# Patient Record
Sex: FEMALE | Race: BLACK OR AFRICAN AMERICAN | NOT HISPANIC OR LATINO | Employment: FULL TIME | URBAN - METROPOLITAN AREA
[De-identification: names, ages, dates, MRNs, and addresses within clinical notes are randomized per-mention and may not be internally consistent; named-entity substitution may affect disease eponyms.]

---

## 2020-09-11 ENCOUNTER — HISTORICAL (OUTPATIENT)
Dept: ADMINISTRATIVE | Facility: HOSPITAL | Age: 50
End: 2020-09-11

## 2020-09-11 LAB
ABS NEUT (OLG): 6.36 X10(3)/MCL (ref 2.1–9.2)
ALBUMIN SERPL-MCNC: 3.6 GM/DL (ref 3.4–5)
ALBUMIN/GLOB SERPL: 0.7 RATIO (ref 1.1–2)
ALP SERPL-CCNC: 90 UNIT/L (ref 45–117)
ALT SERPL-CCNC: 149 UNIT/L (ref 12–78)
APPEARANCE, UA: CLEAR
AST SERPL-CCNC: 140 UNIT/L (ref 15–37)
BACTERIA #/AREA URNS AUTO: ABNORMAL /HPF
BASOPHILS # BLD AUTO: 0 X10(3)/MCL (ref 0–0.2)
BASOPHILS NFR BLD AUTO: 0 %
BILIRUB SERPL-MCNC: 0.5 MG/DL (ref 0.2–1)
BILIRUB UR QL STRIP: NEGATIVE
BUN SERPL-MCNC: 8 MG/DL (ref 7–18)
CALCIUM SERPL-MCNC: 8.9 MG/DL (ref 8.5–10.1)
CHLORIDE SERPL-SCNC: 102 MMOL/L (ref 98–107)
CHOLEST SERPL-MCNC: 224 MG/DL
CHOLEST/HDLC SERPL: 3.2 {RATIO} (ref 0–4.4)
CO2 SERPL-SCNC: 25 MMOL/L (ref 21–32)
COLOR UR: YELLOW
CREAT SERPL-MCNC: 0.8 MG/DL (ref 0.6–1.3)
EOSINOPHIL # BLD AUTO: 0.1 X10(3)/MCL (ref 0–0.9)
EOSINOPHIL NFR BLD AUTO: 1 %
ERYTHROCYTE [DISTWIDTH] IN BLOOD BY AUTOMATED COUNT: 14.4 % (ref 11.5–14.5)
EST. AVERAGE GLUCOSE BLD GHB EST-MCNC: 140 MG/DL
GLOBULIN SER-MCNC: 5.1 GM/ML (ref 2.3–3.5)
GLUCOSE (UA): NEGATIVE
GLUCOSE SERPL-MCNC: 90 MG/DL (ref 74–106)
HBA1C MFR BLD: 6.5 % (ref 4.2–6.3)
HCT VFR BLD AUTO: 37.7 % (ref 35–46)
HDLC SERPL-MCNC: 71 MG/DL (ref 40–59)
HGB BLD-MCNC: 11.7 GM/DL (ref 12–16)
HGB UR QL STRIP: NEGATIVE
HYALINE CASTS #/AREA URNS LPF: ABNORMAL /LPF
IMM GRANULOCYTES # BLD AUTO: 0.03 10*3/UL
IMM GRANULOCYTES NFR BLD AUTO: 0 %
KETONES UR QL STRIP: NEGATIVE
LDLC SERPL CALC-MCNC: 129 MG/DL
LEUKOCYTE ESTERASE UR QL STRIP: NEGATIVE
LYMPHOCYTES # BLD AUTO: 2.6 X10(3)/MCL (ref 0.6–4.6)
LYMPHOCYTES NFR BLD AUTO: 26 %
MCH RBC QN AUTO: 24.3 PG (ref 26–34)
MCHC RBC AUTO-ENTMCNC: 31 GM/DL (ref 31–37)
MCV RBC AUTO: 78.2 FL (ref 80–100)
MONOCYTES # BLD AUTO: 0.6 X10(3)/MCL (ref 0.1–1.3)
MONOCYTES NFR BLD AUTO: 6 %
NEUTROPHILS # BLD AUTO: 6.36 X10(3)/MCL (ref 2.1–9.2)
NEUTROPHILS NFR BLD AUTO: 66 %
NITRITE UR QL STRIP: NEGATIVE
PH UR STRIP: 6.5 [PH] (ref 4.5–8)
PLATELET # BLD AUTO: 350 X10(3)/MCL (ref 130–400)
PMV BLD AUTO: 10.5 FL (ref 7.4–10.4)
POTASSIUM SERPL-SCNC: 4.4 MMOL/L (ref 3.5–5.1)
PROT SERPL-MCNC: 8.7 GM/DL (ref 6.4–8.2)
PROT UR QL STRIP: NEGATIVE
RBC # BLD AUTO: 4.82 X10(6)/MCL (ref 4–5.2)
RBC #/AREA URNS AUTO: ABNORMAL /HPF
SODIUM SERPL-SCNC: 137 MMOL/L (ref 136–145)
SP GR UR STRIP: 1.02 (ref 1–1.03)
SQUAMOUS #/AREA URNS LPF: ABNORMAL /LPF
T4 FREE SERPL-MCNC: 0.78 NG/DL (ref 0.76–1.46)
TRIGL SERPL-MCNC: 118 MG/DL
TSH SERPL-ACNC: 2.45 MIU/L (ref 0.36–3.74)
UROBILINOGEN UR STRIP-ACNC: NORMAL
VLDLC SERPL CALC-MCNC: 24 MG/DL
WBC # SPEC AUTO: 9.7 X10(3)/MCL (ref 4.5–11)
WBC #/AREA URNS AUTO: ABNORMAL /HPF

## 2022-02-09 ENCOUNTER — HISTORICAL (OUTPATIENT)
Dept: RADIOLOGY | Facility: HOSPITAL | Age: 52
End: 2022-02-09

## 2022-02-09 ENCOUNTER — HISTORICAL (OUTPATIENT)
Dept: ADMINISTRATIVE | Facility: HOSPITAL | Age: 52
End: 2022-02-09

## 2022-04-10 ENCOUNTER — HISTORICAL (OUTPATIENT)
Dept: ADMINISTRATIVE | Facility: HOSPITAL | Age: 52
End: 2022-04-10

## 2022-04-30 VITALS
HEIGHT: 66 IN | WEIGHT: 226.44 LBS | BODY MASS INDEX: 36.39 KG/M2 | DIASTOLIC BLOOD PRESSURE: 71 MMHG | SYSTOLIC BLOOD PRESSURE: 106 MMHG | OXYGEN SATURATION: 99 %

## 2022-12-18 ENCOUNTER — HOSPITAL ENCOUNTER (EMERGENCY)
Facility: HOSPITAL | Age: 52
Discharge: HOME | End: 2022-12-18
Attending: FAMILY MEDICINE
Payer: MEDICAID

## 2022-12-18 VITALS
HEART RATE: 95 BPM | OXYGEN SATURATION: 99 % | RESPIRATION RATE: 18 BRPM | DIASTOLIC BLOOD PRESSURE: 84 MMHG | HEIGHT: 67 IN | TEMPERATURE: 100 F | BODY MASS INDEX: 35.31 KG/M2 | SYSTOLIC BLOOD PRESSURE: 127 MMHG | WEIGHT: 225 LBS

## 2022-12-18 DIAGNOSIS — J02.9 ACUTE PHARYNGITIS, UNSPECIFIED ETIOLOGY: Primary | ICD-10-CM

## 2022-12-18 LAB
FLUAV AG UPPER RESP QL IA.RAPID: NOT DETECTED
FLUBV AG UPPER RESP QL IA.RAPID: NOT DETECTED
SARS-COV-2 RNA RESP QL NAA+PROBE: NOT DETECTED
STREP A PCR (OHS): NOT DETECTED

## 2022-12-18 PROCEDURE — 0240U COVID/FLU A&B PCR: CPT | Performed by: NURSE PRACTITIONER

## 2022-12-18 PROCEDURE — 87651 STREP A DNA AMP PROBE: CPT | Performed by: NURSE PRACTITIONER

## 2022-12-18 PROCEDURE — 99283 EMERGENCY DEPT VISIT LOW MDM: CPT

## 2022-12-18 PROCEDURE — 25000003 PHARM REV CODE 250: Performed by: NURSE PRACTITIONER

## 2022-12-18 RX ORDER — AMOXICILLIN 500 MG/1
500 TABLET, FILM COATED ORAL EVERY 12 HOURS
Qty: 20 TABLET | Refills: 0 | Status: SHIPPED | OUTPATIENT
Start: 2022-12-18 | End: 2022-12-28

## 2022-12-18 RX ORDER — ACETAMINOPHEN 325 MG/1
650 TABLET ORAL
Status: COMPLETED | OUTPATIENT
Start: 2022-12-18 | End: 2022-12-18

## 2022-12-18 RX ORDER — ACETAMINOPHEN 500 MG
1000 TABLET ORAL
Status: DISCONTINUED | OUTPATIENT
Start: 2022-12-18 | End: 2022-12-18

## 2022-12-18 RX ADMIN — ACETAMINOPHEN 650 MG: 325 TABLET ORAL at 04:12

## 2022-12-18 NOTE — Clinical Note
"Angelica Basilioen" Grace was seen and treated in our emergency department on 12/18/2022.  She may return to work on 12/21/2022.       If you have any questions or concerns, please don't hesitate to call.      KARYNA Limon"

## 2022-12-18 NOTE — ED PROVIDER NOTES
Encounter Date: 12/18/2022       History     Chief Complaint   Patient presents with    Generalized Body Aches     C/o Fever,Chills,Body Aches,Throat Pain,Fatigue X 1 day     Patient states sore throat, fever, body aches, and chills x 2 days. Denies any coughing or congestion. Denies any nausea, vomiting, or diarrhea. States that sore throat is constant.     The history is provided by the patient.   Sore Throat   This is a new problem. The sore throat symptoms include sore throat and fever.The current episode started yesterday. The problem has been unchanged. The maximum temperature recorded prior to her arrival was 102 - 102.9 F. The pain is at a severity of 8/10. Associated symptoms include headaches. Pertinent negatives include no abdominal pain, congestion, coughing, diarrhea, drooling, ear pain, neck pain, shortness of breath, trouble swallowing or vomiting.   Review of patient's allergies indicates:  No Known Allergies  No past medical history on file.  No past surgical history on file.  No family history on file.     Review of Systems   Constitutional:  Positive for chills and fever.   HENT:  Positive for sore throat. Negative for congestion, drooling, ear pain and trouble swallowing.    Eyes: Negative.    Respiratory: Negative.  Negative for cough and shortness of breath.    Cardiovascular: Negative.    Gastrointestinal: Negative.  Negative for abdominal pain, diarrhea and vomiting.   Endocrine: Negative.    Genitourinary: Negative.    Musculoskeletal: Negative.  Negative for neck pain.   Skin: Negative.  Negative for rash.   Allergic/Immunologic: Negative.    Neurological:  Positive for headaches.   Hematological: Negative.    Psychiatric/Behavioral: Negative.     All other systems reviewed and are negative.    Physical Exam     Initial Vitals [12/18/22 1619]   BP Pulse Resp Temp SpO2   133/89 (!) 112 18 (!) 102.5 °F (39.2 °C) 99 %      MAP       --         Physical Exam    Nursing note and vitals  reviewed.  Constitutional: She appears well-developed and well-nourished. No distress.   HENT:   Head: Normocephalic and atraumatic.   Mouth/Throat: Uvula is midline and mucous membranes are normal. No trismus in the jaw. Oropharyngeal exudate (Mild bilateral tonsillar swelling, erythema, and exudate.) and posterior oropharyngeal erythema present. No tonsillar abscesses.   Eyes: Conjunctivae and EOM are normal. Pupils are equal, round, and reactive to light.   Neck: Neck supple.   Normal range of motion.  Cardiovascular:  Normal rate, regular rhythm, normal heart sounds and intact distal pulses.           Pulmonary/Chest: Breath sounds normal. No respiratory distress. She has no wheezes.   Abdominal: Abdomen is soft. She exhibits no distension. There is no abdominal tenderness.   Musculoskeletal:         General: No tenderness or edema. Normal range of motion.      Cervical back: Normal range of motion and neck supple.     Lymphadenopathy:     She has cervical adenopathy.   Neurological: She is alert and oriented to person, place, and time. She has normal strength. GCS score is 15. GCS eye subscore is 4. GCS verbal subscore is 5. GCS motor subscore is 6.   Skin: Skin is warm and dry. No rash noted.   Psychiatric: She has a normal mood and affect. Thought content normal.       ED Course   Procedures  Labs Reviewed   COVID/FLU A&B PCR - Normal    Narrative:     The Xpert Xpress SARS-CoV-2/FLU/RSV plus is a rapid, multiplexed real-time PCR test intended for the simultaneous qualitative detection and differentiation of SARS-CoV-2, Influenza A, Influenza B, and respiratory syncytial virus (RSV) viral RNA in either nasopharyngeal swab or nasal swab specimens.         STREP GROUP A BY PCR - Normal    Narrative:     The Xpert Xpress Strep A test is a rapid, qualitative in vitro diagnostic test for the detection of Streptococcus pyogenes (Group A ß-hemolytic Streptococcus, Strep A) in throat swab specimens from patients with  signs and symptoms of pharyngitis.            Imaging Results    None          Medications   acetaminophen tablet 650 mg (650 mg Oral Given 12/18/22 1652)     Medical Decision Making:   Initial Assessment:   Patient is awake, alert, ambulatory, and nontoxic appearing in the ED.  Differential Diagnosis:   Covid, Flu, Strep, Pharyngitis, Viral Illness.   Clinical Tests:   Lab Tests: Ordered and Reviewed  ED Management:  Fever has improved after Tylenol in the ED.            ED Course as of 12/18/22 1748   Sun Dec 18, 2022   1742 BP: 139/82 [AB]      ED Course User Index  [AB] KARYNA Limon                 Clinical Impression:   Final diagnoses:  [J02.9] Acute pharyngitis, unspecified etiology (Primary)        ED Disposition Condition    Discharge Stable          ED Prescriptions       Medication Sig Dispense Start Date End Date Auth. Provider    amoxicillin (AMOXIL) 500 MG Tab Take 1 tablet (500 mg total) by mouth every 12 (twelve) hours. for 10 days 20 tablet 12/18/2022 12/28/2022 KARYNA Limon          Follow-up Information       Follow up With Specialties Details Why Contact Info    [employer] [employer] PA-C Internal Medicine In 3 days  [address] [place] [address] [id number] [phone]               KARYNA Limon  12/18/22 8541

## 2023-06-11 ENCOUNTER — HOSPITAL ENCOUNTER (EMERGENCY)
Facility: HOSPITAL | Age: 53
Discharge: HOME | End: 2023-06-11
Attending: INTERNAL MEDICINE
Payer: MEDICAID

## 2023-06-11 VITALS
SYSTOLIC BLOOD PRESSURE: 154 MMHG | HEIGHT: 64 IN | WEIGHT: 215 LBS | TEMPERATURE: 102 F | DIASTOLIC BLOOD PRESSURE: 92 MMHG | BODY MASS INDEX: 36.7 KG/M2 | HEART RATE: 100 BPM | OXYGEN SATURATION: 97 % | RESPIRATION RATE: 18 BRPM

## 2023-06-11 DIAGNOSIS — J02.0 STREP PHARYNGITIS: Primary | ICD-10-CM

## 2023-06-11 LAB
APPEARANCE UR: ABNORMAL
BACTERIA #/AREA URNS AUTO: ABNORMAL /HPF
BILIRUB UR QL STRIP.AUTO: NEGATIVE MG/DL
COLOR UR: YELLOW
FLUAV AG UPPER RESP QL IA.RAPID: NOT DETECTED
FLUBV AG UPPER RESP QL IA.RAPID: NOT DETECTED
GLUCOSE UR QL STRIP.AUTO: NEGATIVE MG/DL
KETONES UR QL STRIP.AUTO: NEGATIVE MG/DL
LEUKOCYTE ESTERASE UR QL STRIP.AUTO: NEGATIVE UNIT/L
MUCOUS THREADS URNS QL MICRO: ABNORMAL /LPF
NITRITE UR QL STRIP.AUTO: NEGATIVE
PH UR STRIP.AUTO: 8 [PH]
PROT UR QL STRIP.AUTO: 30 MG/DL
RBC #/AREA URNS AUTO: ABNORMAL /HPF
RBC UR QL AUTO: NEGATIVE UNIT/L
SARS-COV-2 RNA RESP QL NAA+PROBE: NOT DETECTED
SP GR UR STRIP.AUTO: 1.01
SQUAMOUS #/AREA URNS AUTO: ABNORMAL /HPF
STREP A PCR (OHS): DETECTED
UROBILINOGEN UR STRIP-ACNC: 4 MG/DL
WBC #/AREA URNS AUTO: ABNORMAL /HPF

## 2023-06-11 PROCEDURE — 81001 URINALYSIS AUTO W/SCOPE: CPT | Performed by: INTERNAL MEDICINE

## 2023-06-11 PROCEDURE — 0240U COVID/FLU A&B PCR: CPT | Performed by: INTERNAL MEDICINE

## 2023-06-11 PROCEDURE — 63600175 PHARM REV CODE 636 W HCPCS: Performed by: INTERNAL MEDICINE

## 2023-06-11 PROCEDURE — 87651 STREP A DNA AMP PROBE: CPT | Performed by: INTERNAL MEDICINE

## 2023-06-11 PROCEDURE — 96372 THER/PROPH/DIAG INJ SC/IM: CPT | Performed by: INTERNAL MEDICINE

## 2023-06-11 PROCEDURE — 99284 EMERGENCY DEPT VISIT MOD MDM: CPT

## 2023-06-11 PROCEDURE — 25000003 PHARM REV CODE 250: Performed by: INTERNAL MEDICINE

## 2023-06-11 RX ORDER — LIDOCAINE HYDROCHLORIDE 10 MG/ML
1 INJECTION INFILTRATION; PERINEURAL ONCE
Status: COMPLETED | OUTPATIENT
Start: 2023-06-11 | End: 2023-06-11

## 2023-06-11 RX ORDER — CEFTRIAXONE 1 G/1
1 INJECTION, POWDER, FOR SOLUTION INTRAMUSCULAR; INTRAVENOUS ONCE
Status: COMPLETED | OUTPATIENT
Start: 2023-06-11 | End: 2023-06-11

## 2023-06-11 RX ORDER — PREDNISONE 20 MG/1
20 TABLET ORAL DAILY
Qty: 5 TABLET | Refills: 0 | Status: SHIPPED | OUTPATIENT
Start: 2023-06-11 | End: 2023-06-16

## 2023-06-11 RX ORDER — AMOXICILLIN 875 MG/1
875 TABLET, FILM COATED ORAL EVERY 12 HOURS
Qty: 20 TABLET | Refills: 0 | Status: SHIPPED | OUTPATIENT
Start: 2023-06-11 | End: 2023-06-21

## 2023-06-11 RX ORDER — ACETAMINOPHEN 500 MG
1000 TABLET ORAL
Status: COMPLETED | OUTPATIENT
Start: 2023-06-11 | End: 2023-06-11

## 2023-06-11 RX ORDER — DEXAMETHASONE SODIUM PHOSPHATE 4 MG/ML
8 INJECTION, SOLUTION INTRA-ARTICULAR; INTRALESIONAL; INTRAMUSCULAR; INTRAVENOUS; SOFT TISSUE ONCE
Status: COMPLETED | OUTPATIENT
Start: 2023-06-11 | End: 2023-06-11

## 2023-06-11 RX ADMIN — LIDOCAINE HYDROCHLORIDE 1 ML: 10 INJECTION, SOLUTION INFILTRATION; PERINEURAL at 08:06

## 2023-06-11 RX ADMIN — DEXAMETHASONE SODIUM PHOSPHATE 8 MG: 4 INJECTION, SOLUTION INTRA-ARTICULAR; INTRALESIONAL; INTRAMUSCULAR; INTRAVENOUS; SOFT TISSUE at 08:06

## 2023-06-11 RX ADMIN — CEFTRIAXONE SODIUM 1 G: 1 INJECTION, POWDER, FOR SOLUTION INTRAMUSCULAR; INTRAVENOUS at 08:06

## 2023-06-11 RX ADMIN — ACETAMINOPHEN 1000 MG: 500 TABLET, FILM COATED ORAL at 07:06

## 2023-06-11 NOTE — Clinical Note
"Angelica"Angelica" Grace was seen and treated in our emergency department on 6/11/2023.  She may return to work on 06/15/2023.       If you have any questions or concerns, please don't hesitate to call.      Gordon Galloway, DO"

## 2023-06-11 NOTE — ED PROVIDER NOTES
"     Source of History:  Patient, no limitations    Chief complaint:  Generalized Body Aches (Pt complaint of (1) body aches (2) sore throat (3) urinary frequency)      HPI:  Angelica Edwards is a 52 y.o. female presenting with Generalized Body Aches (Pt complaint of (1) body aches (2) sore throat (3) urinary frequency)         Patient complains of sore throat. Associated symptoms include fevers up to 103 degrees and sore throat.Onset of symptoms was 1 day ago, unchanged since that time. She is drinking plenty of fluids. She has had recent close exposure to someone with proven streptococcal pharyngitis.        Review of Systems   Constitutional symptoms:  Negative except as documented in HPI.   Skin symptoms:  Negative except as documented in HPI.   HEENT symptoms:  Negative except as documented in HPI.   Respiratory symptoms:  Negative except as documented in HPI.   Cardiovascular symptoms:  Negative except as documented in HPI.   Gastrointestinal symptoms:  Negative except as documented in HPI.    Genitourinary symptoms:  Negative except as documented in HPI.   Musculoskeletal symptoms:  Negative except as documented in HPI.   Neurologic symptoms:  Negative except as documented in HPI.   Psychiatric symptoms:  Negative except as documented in HPI.   Allergy/immunologic symptoms:  Negative except as documented in HPI.             Additional review of systems information: All other systems reviewed and otherwise negative.      Review of patient's allergies indicates:  No Known Allergies    PMH:  As per HPI and below:    No past medical history on file.     No family history on file.    No past surgical history on file.         There is no problem list on file for this patient.       Physical Exam:    BP (!) 154/92 (BP Location: Left arm, Patient Position: Sitting)   Pulse 110   Temp (!) 103 °F (39.4 °C)   Resp 18   Ht 5' 4" (1.626 m)   Wt 97.5 kg (215 lb)   SpO2 97%   BMI 36.90 kg/m²     Nursing note and vital " signs reviewed.    General:  Alert, appears uncomfortable, febrile 103 F  Skin: Normal for Ethnic Origin, No cyanosis  HEENT: Normocephalic and atraumatic, Vision unchanged, Pupils symmetric, No icterus , Nasal mucosa is pink and moist, marked pharyngeal erythema  Cardiovascular:  Regular rate and rhythm, No edema  Chest Wall: No deformity, equal chest rise  Respiratory:  Lungs are clear to auscultation, respirations are non-labored.    Musculoskeletal:  No deformity, Normal perfusion to all extremities  Gastrointestinal:  Soft, Non distended  Neurological:  Alert and oriented, normal motor observed, normal speech observed.    Psychiatric:  Cooperative, appropriate mood & affect.        Labs that have been ordered have been independently reviewed and interpreted by myself.     Old Chart Reviewed.      Initial Impression/ Differential Dx:  Viral pharyngitis, strep pharyngitis or other bacterial pharyngitis, postnasal drip, laryngitis, esophagitis/GERD    Considered but not suspected at this time:  Jayro's angina, epiglottitis, foreign body aspiration, retropharyngeal abscess, peritonsillar abscess, esophageal perforation, mediastinitis, sialolithiasis, parotitis, laryngitis, tracheitis, dental/periapical abscess, TMJ disorder, pneumonia      MDM:      Reviewed Nurses Note.    Reviewed Pertinent old records.    Orders Placed This Encounter    COVID/FLU A&B PCR    Strep Group A by PCR    Urinalysis, Reflex to Urine Culture    Urinalysis, Microscopic    acetaminophen tablet 1,000 mg    dexAMETHasone injection 8 mg    cefTRIAXone injection 1 g    LIDOcaine HCL 10 mg/ml (1%) injection 1 mL    predniSONE (DELTASONE) 20 MG tablet    amoxicillin (AMOXIL) 875 MG tablet                    Labs Reviewed   STREP GROUP A BY PCR - Abnormal; Notable for the following components:       Result Value    STREP A PCR ([facility]) Detected (*)     All other components within normal limits    Narrative:     The Xpert Xpress Strep A test is a  rapid, qualitative in vitro diagnostic test for the detection of Streptococcus pyogenes (Group A ß-hemolytic Streptococcus, Strep A) in throat swab specimens from patients with signs and symptoms of pharyngitis.     URINALYSIS, REFLEX TO URINE CULTURE - Abnormal; Notable for the following components:    Appearance, UA Hazy (*)     Protein, UA 30 (*)     Urobilinogen, UA 4.0 (*)     All other components within normal limits   URINALYSIS, MICROSCOPIC - Abnormal; Notable for the following components:    Bacteria, UA Few (*)     Mucous, UA Small (*)     Squamous Epithelial Cells, UA Many (*)     All other components within normal limits   COVID/FLU A&B PCR - Normal    Narrative:     The Xpert Xpress SARS-CoV-2/FLU/RSV plus is a rapid, multiplexed real-time PCR test intended for the simultaneous qualitative detection and differentiation of SARS-CoV-2, Influenza A, Influenza B, and respiratory syncytial virus (RSV) viral RNA in either nasopharyngeal swab or nasal swab specimens.                No orders to display        Admission on 06/11/2023   Component Date Value Ref Range Status    Influenza A PCR 06/11/2023 Not Detected  Not Detected Final    Influenza B PCR 06/11/2023 Not Detected  Not Detected Final    SARS-CoV-2 PCR 06/11/2023 Not Detected  Not Detected, Negative, Invalid Final    STREP A PCR ([facility]) 06/11/2023 Detected (A)  Not Detected Final    Color, UA 06/11/2023 Yellow  Yellow, Light-Yellow, Dark Yellow, Diane, Straw Final    Appearance, UA 06/11/2023 Hazy (A)  Clear Final    Specific Gravity, UA 06/11/2023 1.015   Final    pH, UA 06/11/2023 8.0  5.0 - 8.5 Final    Protein, UA 06/11/2023 30 (A)  Negative mg/dL Final    Glucose, UA 06/11/2023 Negative  Negative, Normal mg/dL Final    Ketones, UA 06/11/2023 Negative  Negative mg/dL Final    Blood, UA 06/11/2023 Negative  Negative unit/L Final    Bilirubin, UA 06/11/2023 Negative  Negative mg/dL Final    Urobilinogen, UA 06/11/2023 4.0 (A)  0.2, 1.0, Normal mg/dL  Final    Nitrites, UA 06/11/2023 Negative  Negative Final    Leukocyte Esterase, UA 06/11/2023 Negative  Negative unit/L Final    Bacteria, UA 06/11/2023 Few (A)  None Seen, Rare, Occasional /HPF Final    Mucous, UA 06/11/2023 Small (A)  None Seen /LPF Final    RBC, UA 06/11/2023 0-5  None Seen, 0-2, 3-5, 0-5 /HPF Final    WBC, UA 06/11/2023 0-2  None Seen, 0-2, 3-5, 0-5 /HPF Final    Squamous Epithelial Cells, UA 06/11/2023 Many (A)  None Seen, Rare, Occasional, Occ /HPF Final       Imaging Results    None                        ED Course as of 06/11/23 0832   Sun Jun 11, 2023   0826 STREP A PCR ([facility])(!): Detected [MP]      ED Course User Index  [MP] Gordon Galloway DO                        Diagnostic Impression:    1. Strep pharyngitis         ED Disposition Condition    Discharge Stable             Follow-up Information       [facility] - Emergency Dept.    Specialty: Emergency Medicine  Why: If symptoms worsen  Contact information:  [address]  [facility] [address]  [phone]                            ED Prescriptions       Medication Sig Dispense Start Date End Date Auth. Provider    predniSONE (DELTASONE) 20 MG tablet Take 1 tablet (20 mg total) by mouth once daily. for 5 days 5 tablet 6/11/2023 6/16/2023 Gordon Galloway DO    amoxicillin (AMOXIL) 875 MG tablet Take 1 tablet (875 mg total) by mouth every 12 (twelve) hours. for 10 days 20 tablet 6/11/2023 6/21/2023 Gordon Galloway DO          Follow-up Information       Follow up With Specialties Details Why Contact Info    [facility] - Emergency Dept Emergency Medicine  If symptoms worsen [address]  [facility] [id number] [phone]             Gordon Galloway DO  06/11/23 0832

## 2023-09-19 ENCOUNTER — HOSPITAL ENCOUNTER (OUTPATIENT)
Dept: RADIOLOGY | Facility: HOSPITAL | Age: 53
Discharge: HOME | End: 2023-09-19
Attending: PHYSICIAN ASSISTANT
Payer: MEDICAID

## 2023-09-19 DIAGNOSIS — M54.42 LOW BACK PAIN WITH LEFT-SIDED SCIATICA: Primary | ICD-10-CM

## 2023-09-19 DIAGNOSIS — M54.42 LOW BACK PAIN WITH LEFT-SIDED SCIATICA: ICD-10-CM

## 2023-09-19 PROCEDURE — 72100 X-RAY EXAM L-S SPINE 2/3 VWS: CPT | Mod: TC

## 2024-01-23 DIAGNOSIS — M47.816 LUMBAR ARTHROPATHY: Primary | ICD-10-CM

## 2024-02-05 ENCOUNTER — HOSPITAL ENCOUNTER (OUTPATIENT)
Dept: RADIOLOGY | Facility: HOSPITAL | Age: 54
Discharge: HOME | End: 2024-02-05
Attending: PHYSICIAN ASSISTANT
Payer: MEDICAID

## 2024-02-05 DIAGNOSIS — M47.816 LUMBAR ARTHROPATHY: ICD-10-CM

## 2024-02-05 PROCEDURE — 72148 MRI LUMBAR SPINE W/O DYE: CPT | Mod: TC

## 2024-12-16 ENCOUNTER — HOSPITAL ENCOUNTER (EMERGENCY)
Facility: HOSPITAL | Age: 54
Discharge: HOME | End: 2024-12-16
Attending: EMERGENCY MEDICINE
Payer: MEDICAID

## 2024-12-16 VITALS
BODY MASS INDEX: 34.87 KG/M2 | TEMPERATURE: 99 F | HEIGHT: 66 IN | WEIGHT: 217 LBS | RESPIRATION RATE: 18 BRPM | DIASTOLIC BLOOD PRESSURE: 99 MMHG | HEART RATE: 78 BPM | OXYGEN SATURATION: 98 % | SYSTOLIC BLOOD PRESSURE: 134 MMHG

## 2024-12-16 DIAGNOSIS — M54.32 BILATERAL SCIATICA: Primary | ICD-10-CM

## 2024-12-16 DIAGNOSIS — M54.31 BILATERAL SCIATICA: Primary | ICD-10-CM

## 2024-12-16 PROCEDURE — 63600175 PHARM REV CODE 636 W HCPCS

## 2024-12-16 PROCEDURE — 99284 EMERGENCY DEPT VISIT MOD MDM: CPT | Mod: 25

## 2024-12-16 PROCEDURE — 96372 THER/PROPH/DIAG INJ SC/IM: CPT

## 2024-12-16 RX ORDER — TRAZODONE HYDROCHLORIDE 50 MG/1
50 TABLET ORAL NIGHTLY
COMMUNITY
Start: 2024-11-22

## 2024-12-16 RX ORDER — AMLODIPINE BESYLATE 5 MG/1
5 TABLET ORAL
COMMUNITY
Start: 2024-12-02

## 2024-12-16 RX ORDER — TRAMADOL HYDROCHLORIDE 50 MG/1
50 TABLET ORAL EVERY 8 HOURS
COMMUNITY
Start: 2024-11-25

## 2024-12-16 RX ORDER — CYCLOBENZAPRINE HCL 10 MG
10 TABLET ORAL 3 TIMES DAILY PRN
Qty: 15 TABLET | Refills: 0 | Status: SHIPPED | OUTPATIENT
Start: 2024-12-16 | End: 2024-12-21

## 2024-12-16 RX ORDER — GABAPENTIN 800 MG/1
800 TABLET ORAL 2 TIMES DAILY
COMMUNITY
Start: 2024-11-22

## 2024-12-16 RX ORDER — LIDOCAINE 50 MG/G
1 PATCH TOPICAL DAILY
Qty: 10 PATCH | Refills: 0 | Status: SHIPPED | OUTPATIENT
Start: 2024-12-16

## 2024-12-16 RX ORDER — ROSUVASTATIN CALCIUM 10 MG/1
10 TABLET, COATED ORAL
COMMUNITY
Start: 2024-12-02

## 2024-12-16 RX ORDER — HYDROCHLOROTHIAZIDE 25 MG/1
25 TABLET ORAL EVERY MORNING
COMMUNITY
Start: 2024-12-02

## 2024-12-16 RX ORDER — SEMAGLUTIDE 1.34 MG/ML
INJECTION, SOLUTION SUBCUTANEOUS
COMMUNITY

## 2024-12-16 RX ORDER — KETOROLAC TROMETHAMINE 10 MG/1
10 TABLET, FILM COATED ORAL EVERY 8 HOURS PRN
Qty: 15 TABLET | Refills: 0 | Status: SHIPPED | OUTPATIENT
Start: 2024-12-16 | End: 2024-12-21

## 2024-12-16 RX ORDER — KETOROLAC TROMETHAMINE 30 MG/ML
30 INJECTION, SOLUTION INTRAMUSCULAR; INTRAVENOUS
Status: COMPLETED | OUTPATIENT
Start: 2024-12-16 | End: 2024-12-16

## 2024-12-16 RX ORDER — CYCLOBENZAPRINE HCL 10 MG
10 TABLET ORAL
Status: DISCONTINUED | OUTPATIENT
Start: 2024-12-16 | End: 2024-12-16 | Stop reason: HOSPADM

## 2024-12-16 RX ADMIN — KETOROLAC TROMETHAMINE 30 MG: 30 INJECTION, SOLUTION INTRAMUSCULAR at 01:12

## 2024-12-16 NOTE — ED PROVIDER NOTES
Encounter Date: 12/16/2024       History     Chief Complaint   Patient presents with    Back Pain     Pt complaint of reoccurring back pain pain not relieved with trammadol RX. Pt denies recent injury     54 y.o.  female with a history of Sciatica, DM, and hypertension presents to Emergency Department with a chief complaint of atraumatic lower back pain. Symptoms began several days ago and have been constant since onset. Patient currently being treated for sciatica outpatient with Tramadol. Has had previous imaging outpatient. Reports pain radiates down bilateral legs. Associated symptoms: numbness and tingling. Symptoms are aggravated with palpation and exertion and there are no alleviating factors. The patient denies CP, SOB, injury/trauma, fever, chills, incontinence, or saddle anesthesia.. No other reported symptoms at this time      The history is provided by the patient. No  was used.   Back Pain   This is a chronic problem. The current episode started several days ago. The problem occurs throughout the day. The problem has been unchanged. The pain is associated with no known injury. The pain is present in the gluteal region. The pain radiates to the left leg and right leg. The pain is The same all the time. Associated symptoms include numbness and tingling. Pertinent negatives include no chest pain, no fever, no abdominal pain, no abdominal swelling, no bowel incontinence, no perianal numbness, no bladder incontinence, no dysuria, no pelvic pain, no paresthesias, no paresis and no weakness. She has tried analgesics for the symptoms. The treatment provided mild relief.     Review of patient's allergies indicates:  No Known Allergies  Past Medical History:   Diagnosis Date    HTN (hypertension)     Mixed hyperlipidemia     Sciatica     T2DM (type 2 diabetes mellitus)      History reviewed. No pertinent surgical history.  No family history on file.  Social History     Tobacco  Use    Smoking status: Never     Passive exposure: Never    Smokeless tobacco: Never   Substance Use Topics    Alcohol use: Never    Drug use: Never     Review of Systems   Constitutional:  Negative for diaphoresis, fatigue and fever.   Eyes:  Negative for photophobia and visual disturbance.   Respiratory:  Negative for cough, shortness of breath, wheezing and stridor.    Cardiovascular:  Negative for chest pain.   Gastrointestinal:  Negative for abdominal pain, bowel incontinence, nausea and vomiting.   Genitourinary:  Negative for bladder incontinence, dysuria and pelvic pain.   Musculoskeletal:  Positive for back pain and myalgias. Negative for gait problem and joint swelling.   Neurological:  Positive for tingling and numbness. Negative for dizziness, tremors, facial asymmetry, weakness and paresthesias.   All other systems reviewed and are negative.      Physical Exam     Initial Vitals [12/16/24 1128]   BP Pulse Resp Temp SpO2   (!) 134/99 78 18 98.6 °F (37 °C) 98 %      MAP       --         Physical Exam    Nursing note and vitals reviewed.  Constitutional: She appears well-developed and well-nourished. She is not diaphoretic. She is cooperative.  Non-toxic appearance. No distress.   HENT:   Head: Normocephalic and atraumatic.   Right Ear: External ear normal.   Left Ear: External ear normal.   Nose: Nose normal.   Eyes: Conjunctivae and EOM are normal. Pupils are equal, round, and reactive to light.   Neck: Neck supple.   Normal range of motion.  Cardiovascular:  Normal rate, regular rhythm, S1 normal, S2 normal, normal heart sounds, intact distal pulses and normal pulses.           Pulmonary/Chest: Effort normal and breath sounds normal. No tachypnea and no bradypnea. No respiratory distress. She has no decreased breath sounds. She has no wheezes. She has no rhonchi. She has no rales.   Abdominal: Abdomen is soft. Bowel sounds are normal. She exhibits no distension. There is no abdominal tenderness. There  is no rebound.   Musculoskeletal:         General: Tenderness present. Normal range of motion.      Cervical back: Normal range of motion and neck supple.        Legs:       Comments: Tenderness noted to outlined area. No spinous tenderness on exam. Full 5/5 ROM noted. CMS intact. All other adjacent joints otherwise normal.        Neurological: She is alert and oriented to person, place, and time. She has normal strength. No sensory deficit. GCS score is 15. GCS eye subscore is 4. GCS verbal subscore is 5. GCS motor subscore is 6.   Skin: Skin is warm and dry. Capillary refill takes less than 2 seconds.   Psychiatric: She has a normal mood and affect. Thought content normal.         ED Course   Procedures  Labs Reviewed - No data to display       Imaging Results    None          Medications   cyclobenzaprine tablet 10 mg (10 mg Oral Not Given 12/16/24 1230)   ketorolac injection 30 mg (30 mg Intramuscular Given 12/16/24 1308)     Medical Decision Making  Patient awake, alert, has non-labored breathing, and follows commands appropriately. Arrived to ED due to atraumatic back pain. Patient reports this is chronic. Hx of sciatica. Afebrile. Denies injury/trauma. NAD Noted.     Judging by the patient's chief complaint and pertinent history, the patient has the following possible differential diagnoses, including but not limited to the following: Chronic Back Pain, Sciatica, Lumbar Strain     Some of these are deemed to be lower likelihood and some more likely based on my physical exam and history combined with possible lab work and/or imaging studies. Please see the pertinent studies, and refer to the HPI. Some of these diagnoses will take further evaluation to fully rule out, perhaps as an outpatient and the patient was encouraged to follow up when discharged for more comprehensive evaluation.       Amount and/or Complexity of Data Reviewed  Discussion of management or test interpretation with external provider(s):  Discussed plan of care and interventions with patient. Agreed to and aware of plan of care. Comfortable being discharged home. Patient discharged home. Patient denies new or additional complaints; no further tests indicated at this time. Verbalized understanding of instructions. No emergent or apparent distress noted prior to discharge. Strict ER return precautions given.       Risk  OTC drugs.  Prescription drug management.               ED Course as of 12/16/24 1327   Mon Dec 16, 2024   1231 Patient's symptoms chronic; currently being treated outpatient for sciatica and has previous MRI. Since patient denies recent injury/trauma, symptoms ongoing, and no red flags, no imaging indicated currently. Will give medication to treat symptoms. Cautioned on side effects. Patient's script of Tramadol is 30 days, instructed to f/u with PCP regarding change of medication. At disposition, patient has no additional complaints, has no red flags, ambulatory, has no spinous tenderness, and non-toxic in appearance. Stable for discharge home.  [JA]      ED Course User Index  [JA] Marce Sheehan NP                           Clinical Impression:  Final diagnoses:  [M54.31, M54.32] Bilateral sciatica (Primary)          ED Disposition Condition    Discharge Stable          ED Prescriptions       Medication Sig Dispense Start Date End Date Auth. Provider    ketorolac (TORADOL) 10 mg tablet Take 1 tablet (10 mg total) by mouth every 8 (eight) hours as needed for Pain. 15 tablet 12/16/2024 12/21/2024 Marce Sheehan NP    LIDOcaine (LIDODERM) 5 % Place 1 patch onto the skin once daily. Remove & Discard patch within 12 hours or as directed by MD 10 patch 12/16/2024 -- Marce Sheehan, NP    cyclobenzaprine (FLEXERIL) 10 MG tablet Take 1 tablet (10 mg total) by mouth 3 (three) times daily as needed for Muscle spasms. 15 tablet 12/16/2024 12/21/2024 Marce Sheehan, DUARTE          Follow-up Information       Follow up With  Specialties Details Why Contact Info    [facility] PA-C Internal Medicine Call in 1 day If symptoms worsen, As needed [address] [facility] [address] [phone]      [facility] - Emergency Dept Emergency Medicine Go to  If symptoms worsen, As needed [id number]  [facility] [id number] [phone]             Marce Sheehan, NP  12/16/24 4589

## 2024-12-16 NOTE — DISCHARGE INSTRUCTIONS
Thanks for letting us take care of you today!  It is our goal to give you courteous care and to keep you comfortable and informed, if you have any questions before you leave I will be happy to try and answer them.    Here is some advice after your visit:      Your visit in the emergency department is NOT definitive care - please follow-up with your primary care doctor and/or specialist within 1 week.  Please return if you have any worsening in your condition or if you have any other concerns.    You have been prescribed Toradol for pain. This is an Non-Steroidal Anti-Inflammatory (NSAID) Medication. Please do not take any additional NSAIDs while you are taking this medication including (Advil, Aleve, Motrin, Ibuprofen, Mobic\meloxicam, Naprosyn, Toradol, etc.). Please stop taking this medication if you experience: weakness, itching, yellow skin or eyes, joint pains, vomiting blood, blood or black stools, unusual weight gain, or swelling in your arms, legs, hands, or feet.     You have been prescribed Flexeril (Cyclobenzaprine) for muscle spasms/pain. Please do not take this medication while working, drinking alcohol, swimming, or while driving/operating heavy machinery. This medication may cause drowsiness, dizziness, impair judgment, and reduce physical capabilities.You should not drive, operate heavy machinery, or make life changing decisions while taking this medication.      While in the Emergency Department you received medication that may cause drowsiness, dizziness, impaired judgment, and reduced physical capabilities. You should not drive, operate heavy machinery, swim, or make life  changing decisions within 24 hours of receiving this medication.

## 2024-12-16 NOTE — ED TRIAGE NOTES
Pt complaint of reoccurring back pain pain not relieved with trammadol RX. Pt denies recent injury

## 2025-05-19 DIAGNOSIS — R05.8 OTHER COUGH: Primary | ICD-10-CM

## 2025-06-20 ENCOUNTER — CLINICAL SUPPORT (OUTPATIENT)
Dept: RESPIRATORY THERAPY | Facility: HOSPITAL | Age: 55
End: 2025-06-20
Attending: PHYSICIAN ASSISTANT
Payer: MEDICAID

## 2025-06-20 VITALS — OXYGEN SATURATION: 99 % | RESPIRATION RATE: 18 BRPM | HEART RATE: 66 BPM

## 2025-06-20 DIAGNOSIS — R05.8 OTHER COUGH: ICD-10-CM

## 2025-06-20 LAB
DLCO ADJ PRE: 19 ML/(MIN*MMHG)
DLCO SINGLE BREATH LLN: 19.07
DLCO SINGLE BREATH PRE REF: 76.6 %
DLCO SINGLE BREATH REF: 24.81
DLCOC SBVA LLN: 3.29
DLCOC SBVA PRE REF: 95.3 %
DLCOC SBVA REF: 4.68
DLCOC SINGLE BREATH LLN: 19.07
DLCOC SINGLE BREATH PRE REF: 76.6 %
DLCOC SINGLE BREATH REF: 24.81
DLCOVA LLN: 3.29
DLCOVA PRE REF: 95.3 %
DLCOVA PRE: 4.46 ML/(MIN*MMHG*L)
DLCOVA REF: 4.68
DLVAADJ PRE: 4.46 ML/(MIN*MMHG*L)
ERV LLN: -16449.1
ERV PRE REF: 60.2 %
ERV REF: 0.9
FEF 25 75 LLN: 1.04
FEF 25 75 PRE REF: 94.4 %
FEF 25 75 REF: 2.28
FEV1 FVC LLN: 69
FEV1 FVC PRE REF: 92.5 %
FEV1 FVC REF: 80
FEV1 LLN: 1.79
FEV1 PRE REF: 108.1 %
FEV1 REF: 2.42
FRCPLETH LLN: 2
FRCPLETH PREREF: 68.6 %
FRCPLETH REF: 2.82
FVC LLN: 2.28
FVC PRE REF: 116.2 %
FVC REF: 3.04
IVC PRE: 3.1 L
IVC SINGLE BREATH LLN: 2.28
IVC SINGLE BREATH PRE REF: 102.1 %
IVC SINGLE BREATH REF: 3.04
MVV LLN: 90
MVV PRE REF: 91.1 %
MVV REF: 106
PEF LLN: 4.13
PEF PRE REF: 71.3 %
PEF REF: 6.27
PRE DLCO: 19 ML/(MIN*MMHG)
PRE ERV: 0.54 L
PRE FEF 25 75: 2.15 L/S
PRE FET 100: 8.7 SEC
PRE FEV1 FVC: 73.99 %
PRE FEV1: 2.61 L
PRE FRC PL: 1.93 L
PRE FVC: 3.53 L
PRE MVV: 97 L/MIN
PRE PEF: 4.47 L/S
PRE RV: 1.15 L
PRE TLC: 4.71 L
RAW LLN: 3.06
RAW PRE REF: 129 %
RAW PRE: 3.95 CMH2O*S/L
RAW REF: 3.06
RV LLN: 1.35
RV PRE REF: 59.7 %
RV REF: 1.92
RVTLC LLN: 28
RVTLC PRE REF: 64.6 %
RVTLC PRE: 24.32 %
RVTLC REF: 38
TLC LLN: 4.31
TLC PRE REF: 89 %
TLC REF: 5.3
VA PRE: 4.26 L
VA SINGLE BREATH LLN: 5.15
VA SINGLE BREATH PRE REF: 82.7 %
VA SINGLE BREATH REF: 5.15
VC LLN: 2.28
VC PRE REF: 117.4 %
VC PRE: 3.57 L
VC REF: 3.04
VTGRAWPRE: 2.3 L

## 2025-06-20 PROCEDURE — 94010 BREATHING CAPACITY TEST: CPT

## 2025-06-20 PROCEDURE — 94761 N-INVAS EAR/PLS OXIMETRY MLT: CPT

## 2025-06-20 PROCEDURE — 25000242 PHARM REV CODE 250 ALT 637 W/ HCPCS: Performed by: INTERNAL MEDICINE

## 2025-06-20 PROCEDURE — 63600175 PHARM REV CODE 636 W HCPCS: Performed by: INTERNAL MEDICINE

## 2025-06-20 PROCEDURE — 95070 INHLJ BRNCL CHALLENGE TSTG: CPT

## 2025-06-20 PROCEDURE — 94726 PLETHYSMOGRAPHY LUNG VOLUMES: CPT

## 2025-06-20 PROCEDURE — 94729 DIFFUSING CAPACITY: CPT

## 2025-06-20 PROCEDURE — 99900031 HC PATIENT EDUCATION (STAT)

## 2025-06-20 PROCEDURE — 99900035 HC TECH TIME PER 15 MIN (STAT)

## 2025-06-20 RX ORDER — METHACHOLINE CHLORIDE 0.75/3ML
3 VIAL, NEBULIZER (ML) INHALATION ONCE
Status: COMPLETED | OUTPATIENT
Start: 2025-06-20 | End: 2025-06-20

## 2025-06-20 RX ORDER — METHACHOLINE CHLORIDE 0.1875/3ML
3 VIAL, NEBULIZER (ML) INHALATION ONCE
Status: COMPLETED | OUTPATIENT
Start: 2025-06-20 | End: 2025-06-20

## 2025-06-20 RX ORDER — METHACHOLINE CHLORIDE 12 MG/3 ML
3 VIAL, NEBULIZER (ML) INHALATION ONCE
Status: COMPLETED | OUTPATIENT
Start: 2025-06-20 | End: 2025-06-20

## 2025-06-20 RX ORDER — METHACHOLINE CHLORIDE 3 MG/3 ML
3 VIAL, NEBULIZER (ML) INHALATION ONCE
Status: COMPLETED | OUTPATIENT
Start: 2025-06-20 | End: 2025-06-20

## 2025-06-20 RX ORDER — ALBUTEROL SULFATE 0.83 MG/ML
2.5 SOLUTION RESPIRATORY (INHALATION)
Status: COMPLETED | OUTPATIENT
Start: 2025-06-20 | End: 2025-06-20

## 2025-06-20 RX ORDER — METHACHOLINE CHLORIDE 48 MG/3 ML
3 VIAL, NEBULIZER (ML) INHALATION ONCE
Status: COMPLETED | OUTPATIENT
Start: 2025-06-20 | End: 2025-06-20

## 2025-06-20 RX ORDER — METHACHOLINE CHLORIDE 0 MG/3 ML
3 VIAL, NEBULIZER (ML) INHALATION ONCE
Status: COMPLETED | OUTPATIENT
Start: 2025-06-20 | End: 2025-06-20

## 2025-06-20 RX ADMIN — Medication 0.75 MG: at 10:06

## 2025-06-20 RX ADMIN — ALBUTEROL SULFATE 2.5 MG: 2.5 SOLUTION RESPIRATORY (INHALATION) at 11:06

## 2025-06-20 RX ADMIN — Medication 0.19 MG: at 10:06

## 2025-06-20 RX ADMIN — Medication 12 MG: at 10:06

## 2025-06-20 RX ADMIN — Medication 3 MG: at 10:06

## 2025-06-20 RX ADMIN — METHACHOLINE CHLORIDE INHALATION SOLUTION 3 ML: KIT at 10:06

## 2025-06-20 RX ADMIN — Medication 48 MG: at 11:06

## 2025-06-20 NOTE — PROGRESS NOTES
Patient educated on PFT and methacholine procedure prior to starting.  Patient gave a good effort and was cooperative.  Methacholine doing maxed at 16mg/ml dose.  Neb tx given with 2.5mg Albuterol and jez well.  Patient states she felt well after testing.